# Patient Record
Sex: FEMALE | Race: WHITE | NOT HISPANIC OR LATINO | Employment: FULL TIME | URBAN - METROPOLITAN AREA
[De-identification: names, ages, dates, MRNs, and addresses within clinical notes are randomized per-mention and may not be internally consistent; named-entity substitution may affect disease eponyms.]

---

## 2021-03-20 ENCOUNTER — OFFICE VISIT (OUTPATIENT)
Dept: URGENT CARE | Facility: URGENT CARE | Age: 31
End: 2021-03-20
Payer: COMMERCIAL

## 2021-03-20 VITALS
SYSTOLIC BLOOD PRESSURE: 119 MMHG | DIASTOLIC BLOOD PRESSURE: 81 MMHG | OXYGEN SATURATION: 100 % | TEMPERATURE: 99.2 F | HEART RATE: 70 BPM | WEIGHT: 190.2 LBS

## 2021-03-20 DIAGNOSIS — B37.31 CANDIDIASIS OF VAGINA: ICD-10-CM

## 2021-03-20 DIAGNOSIS — H60.11 CELLULITIS OF EXTERNAL EAR, RIGHT: Primary | ICD-10-CM

## 2021-03-20 DIAGNOSIS — R50.9 FEVER, UNSPECIFIED FEVER CAUSE: ICD-10-CM

## 2021-03-20 LAB
DEPRECATED S PYO AG THROAT QL EIA: NEGATIVE
SPECIMEN SOURCE: NORMAL

## 2021-03-20 PROCEDURE — 99N1174 PR STATISTIC STREP A RAPID: Performed by: INTERNAL MEDICINE

## 2021-03-20 PROCEDURE — 87651 STREP A DNA AMP PROBE: CPT | Performed by: INTERNAL MEDICINE

## 2021-03-20 PROCEDURE — 99203 OFFICE O/P NEW LOW 30 MIN: CPT | Performed by: INTERNAL MEDICINE

## 2021-03-20 RX ORDER — NICOTINE POLACRILEX 4 MG/1
20 GUM, CHEWING ORAL DAILY
COMMUNITY

## 2021-03-20 RX ORDER — DOXYCYCLINE 100 MG/1
100 CAPSULE ORAL 2 TIMES DAILY
Qty: 20 CAPSULE | Refills: 0 | Status: SHIPPED | OUTPATIENT
Start: 2021-03-20 | End: 2021-03-30

## 2021-03-20 RX ORDER — BUSPIRONE HYDROCHLORIDE 7.5 MG/1
7.5 TABLET ORAL 3 TIMES DAILY
COMMUNITY

## 2021-03-20 RX ORDER — BUPROPION HYDROCHLORIDE 100 MG/1
100 TABLET, EXTENDED RELEASE ORAL 2 TIMES DAILY
COMMUNITY

## 2021-03-20 RX ORDER — FLUCONAZOLE 150 MG/1
150 TABLET ORAL ONCE
Qty: 1 TABLET | Refills: 0 | Status: SHIPPED | OUTPATIENT
Start: 2021-03-20 | End: 2021-03-20

## 2021-03-20 RX ORDER — HYDROXYZINE HYDROCHLORIDE 50 MG/1
50 TABLET, FILM COATED ORAL 3 TIMES DAILY PRN
COMMUNITY

## 2021-03-21 LAB
SPECIMEN SOURCE: NORMAL
STREP GROUP A PCR: NOT DETECTED

## 2021-03-21 NOTE — PROGRESS NOTES
Assessment & Plan     Cellulitis of external ear, right  - doxycycline hyclate (VIBRAMYCIN) 100 MG capsule; Take 1 capsule (100 mg) by mouth 2 times daily for 10 days    Fever, unspecified fever cause  - Streptococcus A Rapid Scr w Reflx to PCR  - Group A Streptococcus PCR Throat Swab    Candidiasis of vagina  - fluconazole (DIFLUCAN) 150 MG tablet; Take 1 tablet (150 mg) by mouth once for 1 dose      Hema Narayanan MD  [id number] [place]    Subjective     HPI   Son had strep 10 days ago.  She was negative at that time.  Now she is noting ear pain and sensitivity on the inner portion of the right ear.  Feels very sensitive in the ear canal and has more pain with jaw opening and pressure over the right side of the face.  No pain with swallowing.  Does sound muffled. Pain with head rotation.  Low grade fevers (up to 99.7) over the last three days.  No cough.  No nasal congestion.         Review of Systems   Constitutional, HEENT, cardiovascular, pulmonary, gi and gu systems are negative, except as otherwise noted.      Objective    /81   Pulse 70   Temp 99.2  F (37.3  C)   Wt 86.3 kg (190 lb 3.2 oz)   SpO2 100%   There is no height or weight on file to calculate BMI.  Physical Exam   GENERAL APPEARANCE: healthy, alert and no distress  EYES: Eyes grossly normal to inspection, PERRL and conjunctivae and sclerae normal  HENT: OP clear but there is significant right facial pain with jaw opening; there is warmth, mild erythema, swelling and tenderness of the external ear on the right which extends anteriorly over the cheek and along the line of the mandible as well as into the submandibular soft tissues; there is also some tenderness behind the external ear; the ear canal is erythematous and swollen; the skin of the external ear is flaking and eczematous; the right TM is clear; the left TM is clear  NECK: swelling and tenderness involving the soft tissues of the anterior cervical triangle  just below the angle of the jaw on the right side; no focal mass, nodule, fluctuance or induration  RESP: lungs clear to auscultation - no rales, rhonchi or wheezes    Results for orders placed or performed in visit on 03/20/21 (from the past 24 hour(s))   Streptococcus A Rapid Scr w Reflx to PCR    Specimen: Throat   Result Value Ref Range    Strep Specimen Description Throat     Streptococcus Group A Rapid Screen Negative NEG^Negative

## 2021-04-09 ENCOUNTER — OFFICE VISIT (OUTPATIENT)
Dept: URGENT CARE | Facility: URGENT CARE | Age: 31
End: 2021-04-09
Payer: COMMERCIAL

## 2021-04-09 VITALS
TEMPERATURE: 97.6 F | DIASTOLIC BLOOD PRESSURE: 70 MMHG | WEIGHT: 193 LBS | HEART RATE: 73 BPM | OXYGEN SATURATION: 100 % | SYSTOLIC BLOOD PRESSURE: 127 MMHG

## 2021-04-09 DIAGNOSIS — N64.4 BREAST PAIN, RIGHT: Primary | ICD-10-CM

## 2021-04-09 LAB — HCG UR QL: NEGATIVE

## 2021-04-09 PROCEDURE — 99203 OFFICE O/P NEW LOW 30 MIN: CPT | Performed by: PHYSICIAN ASSISTANT

## 2021-04-09 PROCEDURE — 81025 URINE PREGNANCY TEST: CPT | Performed by: PHYSICIAN ASSISTANT

## 2021-04-09 RX ORDER — IBUPROFEN 200 MG
200 TABLET ORAL EVERY 4 HOURS PRN
COMMUNITY

## 2021-04-09 NOTE — PROGRESS NOTES
Assessment & Plan     1. Breast pain, right  Patient with right breast pain for the past one month.  On exam, breast is benign.  No lumps are noted.  She does not have bruising, swelling or erythema noted.   Given her duration of symptoms, I advised her to recheck to her primary doctor to obtain mammography plus or minus ultrasound.  Patient is agreement with treatment plan.        Return in about 1 week (around 4/16/2021) for Follow up with PCP for breast imaging.    Diagnosis and treatment plan was reviewed with patient and/or family.   We went over any labs or imaging.   Patient verbalizes understanding. All questions were addressed and answered.     GUILLERMO Waters [facility] [place]    CHIEF COMPLAINT:   Chief Complaint   Patient presents with     Urgent Care     Breast Problem     R. side is uncomfortable x 1 mo.     Galileo Subramanian is a 31 year old female who presents to clinic today for evaluation of breast pain.  For the past 1 month patient has had discomfort of her right breast.  Reports that pain is localized to her nipple.  She has been using Vaseline and Aquaphor on the area without improvement in symptoms.  She has not noticed a rash, discharge, redness or swelling.  She uses Nexplanon for birth control, and does not get her period.  Denies having a fever, chills.  She does not have family history of breast cancer.      No past medical history on file.  No past surgical history on file.  Social History     Tobacco Use     Smoking status: Never Smoker     Smokeless tobacco: Never Used   Substance Use Topics     Alcohol use: Not on file     Current Outpatient Medications   Medication     albuterol (PROAIR RESPICLICK) 108 (90 Base) MCG/ACT inhaler     buPROPion (WELLBUTRIN SR) 100 MG 12 hr tablet     busPIRone (BUSPAR) 7.5 MG tablet     hydrOXYzine (ATARAX) 50 MG tablet     ibuprofen (ADVIL/MOTRIN) 200 MG tablet     omeprazole 20 MG tablet     No current  facility-administered medications for this visit.      No Known Allergies    10 point ROS of systems were all negative except for pertinent positives noted in my HPI.      Exam:  /70   Pulse 73   Temp 97.6  F (36.4  C)   Wt 87.5 kg (193 lb)   SpO2 100%   Constitutional: healthy, alert and no distress  Head: Normocephalic, atraumatic.  Eyes: conjunctiva clear, no drainage  Breast: R breast has TTP around nipple. No swelling, erythema or nipple drainage is noted. Breast lump is not palpated.   Skin: no rashes  Neurologic: Speech clear, gait normal. Moves all extremities.    Results for orders placed or performed in visit on 04/09/21   HCG Qual, Urine ([id number])     Status: None   Result Value Ref Range    HCG Qual Urine Negative NEG^Negative

## 2022-08-17 ENCOUNTER — OFFICE VISIT (OUTPATIENT)
Dept: URGENT CARE | Facility: URGENT CARE | Age: 32
End: 2022-08-17
Payer: COMMERCIAL

## 2022-08-17 VITALS
DIASTOLIC BLOOD PRESSURE: 80 MMHG | HEART RATE: 84 BPM | TEMPERATURE: 99.1 F | OXYGEN SATURATION: 100 % | SYSTOLIC BLOOD PRESSURE: 115 MMHG

## 2022-08-17 DIAGNOSIS — R05.9 COUGH: Primary | ICD-10-CM

## 2022-08-17 DIAGNOSIS — J45.21 MILD INTERMITTENT ASTHMA WITH ACUTE EXACERBATION: ICD-10-CM

## 2022-08-17 LAB
DEPRECATED S PYO AG THROAT QL EIA: NEGATIVE
GROUP A STREP BY PCR: NOT DETECTED

## 2022-08-17 PROCEDURE — U0005 INFEC AGEN DETEC AMPLI PROBE: HCPCS | Performed by: FAMILY MEDICINE

## 2022-08-17 PROCEDURE — 87651 STREP A DNA AMP PROBE: CPT | Performed by: FAMILY MEDICINE

## 2022-08-17 PROCEDURE — U0003 INFECTIOUS AGENT DETECTION BY NUCLEIC ACID (DNA OR RNA); SEVERE ACUTE RESPIRATORY SYNDROME CORONAVIRUS 2 (SARS-COV-2) (CORONAVIRUS DISEASE [COVID-19]), AMPLIFIED PROBE TECHNIQUE, MAKING USE OF HIGH THROUGHPUT TECHNOLOGIES AS DESCRIBED BY CMS-2020-01-R: HCPCS | Performed by: FAMILY MEDICINE

## 2022-08-17 PROCEDURE — 99214 OFFICE O/P EST MOD 30 MIN: CPT | Mod: CS | Performed by: FAMILY MEDICINE

## 2022-08-17 RX ORDER — PREDNISONE 20 MG/1
40 TABLET ORAL DAILY
Qty: 10 TABLET | Refills: 0 | Status: SHIPPED | OUTPATIENT
Start: 2022-08-17 | End: 2022-08-22

## 2022-08-17 RX ORDER — BENZONATATE 200 MG/1
200 CAPSULE ORAL 3 TIMES DAILY PRN
Qty: 30 CAPSULE | Refills: 0 | Status: SHIPPED | OUTPATIENT
Start: 2022-08-17

## 2022-08-17 RX ORDER — ALBUTEROL SULFATE 90 UG/1
2 AEROSOL, METERED RESPIRATORY (INHALATION) EVERY 6 HOURS PRN
Qty: 18 G | Refills: 0 | Status: SHIPPED | OUTPATIENT
Start: 2022-08-17

## 2022-08-17 NOTE — PROGRESS NOTES
SUBJECTIVE:   Marilynn Light is a 32 year old female presenting with a chief complaint of cough, sore throat, fever.  Onset of symptoms was 4 day(s) ago.  Course of illness is worsening.    Severity moderate  Current and Associated symptoms: cough, SOB/wheezing, fever  Treatment measures tried include OTC Cough med - mucinex, Fluids, Rest and ibuprofen.  Predisposing factors include ill contact: Family member - 3 year old pee .    Has asthma but mild, no longer has inhaler    Has sore throat initially but this did improve but then feeling more in lungs, endorse wheezing and SOB    No TOB use    Completed COVID vaccination, boosted X1    No past medical history on file.  Current Outpatient Medications   Medication Sig Dispense Refill     albuterol (PROAIR RESPICLICK) 108 (90 Base) MCG/ACT inhaler Inhale 2 puffs into the lungs every 6 hours as needed for shortness of breath / dyspnea or wheezing       buPROPion (WELLBUTRIN SR) 100 MG 12 hr tablet Take 100 mg by mouth 2 times daily       busPIRone (BUSPAR) 7.5 MG tablet Take 7.5 mg by mouth 3 times daily       hydrOXYzine (ATARAX) 50 MG tablet Take 50 mg by mouth 3 times daily as needed for itching       ibuprofen (ADVIL/MOTRIN) 200 MG tablet Take 200 mg by mouth every 4 hours as needed for mild pain       omeprazole 20 MG tablet Take 20 mg by mouth daily       Social History     Tobacco Use     Smoking status: Never Smoker     Smokeless tobacco: Never Used   Substance Use Topics     Alcohol use: Not on file       ROS:  Review of systems negative except as stated above.    OBJECTIVE:  /80   Pulse 84   Temp 99.1  F (37.3  C)   SpO2 100%   GENERAL APPEARANCE: healthy, alert and no distress  EYES: EOMI,  PERRL, conjunctiva clear  HENT: Mouth without ulcers, erythema or lesions  RESP: lungs with a few rhonchi and scattered wheezes, no crackles  PSYCH: mentation appears normal and affect normal/bright    Results for orders placed or performed in visit on 08/17/22    Streptococcus A Rapid Screen w/Reflex to PCR - Clinic Collect     Status: Normal    Specimen: Throat; Swab   Result Value Ref Range    Group A Strep antigen Negative Negative       ASSESSMENT/PLAN:  (R05.9) Cough  (primary encounter diagnosis)  Plan: Symptomatic; Unknown COVID-19 Virus         (Coronavirus) by PCR Nose, Streptococcus A         Rapid Screen w/Reflex to PCR - Clinic Collect,         Group A Streptococcus PCR Throat Swab,         benzonatate (TESSALON) 200 MG capsule,         albuterol (PROAIR HFA/PROVENTIL HFA/VENTOLIN         HFA) 108 (90 Base) MCG/ACT inhaler            (J45.21) Mild intermittent asthma with acute exacerbation  Plan: albuterol (PROAIR HFA/PROVENTIL HFA/VENTOLIN         HFA) 108 (90 Base) MCG/ACT inhaler, predniSONE         (DELTASONE) 20 MG tablet            Reassurance given, discussed that most likely viral etiology, reviewed symptomatic treatment with tylenol, ibuprofen, plenty of fluids and rest.  Will follow up on throat culture and treat if positive for strep.  COVID screen obtained as symptoms overlap with COVID infection, quarantine while awaiting result.    RX tessalon perles given to help with cough.  RX albuterol inhaler given for asthma symptoms.  RX prednisone burst given for asthma flare if symptoms persists or worsens.    Follow up with primary provider if no improvement of symptoms in 1 week    Giancarlo Simon MD  August 17, 2022 2:46 PM

## 2022-08-17 NOTE — PATIENT INSTRUCTIONS
Okay to take ibuprofen 200 mg - 4 tablets (800 mg) every 8 hours as needed.  Okay to take tylenol 500 mg - 2 tablets (1000 mg) every 6-8 hours as needed, do not exceed 3000 mg in 24 hours.  Okay to take tessalon perles to help with cough  Use albuterol inhaler to help with cough, shortness of breathing or wheezing  If asthma symptoms worsens, take prednisone burst    We will contact you if the throat culture is positive for strep

## 2022-08-18 LAB — SARS-COV-2 RNA RESP QL NAA+PROBE: NEGATIVE

## 2022-09-08 DIAGNOSIS — R05.9 COUGH: ICD-10-CM

## 2022-09-08 DIAGNOSIS — J45.21 MILD INTERMITTENT ASTHMA WITH ACUTE EXACERBATION: ICD-10-CM

## 2022-09-09 RX ORDER — ALBUTEROL SULFATE 90 UG/1
AEROSOL, METERED RESPIRATORY (INHALATION)
OUTPATIENT
Start: 2022-09-09

## 2022-09-25 ENCOUNTER — HEALTH MAINTENANCE LETTER (OUTPATIENT)
Age: 32
End: 2022-09-25

## 2023-10-14 ENCOUNTER — HEALTH MAINTENANCE LETTER (OUTPATIENT)
Age: 33
End: 2023-10-14

## 2024-12-01 ENCOUNTER — HEALTH MAINTENANCE LETTER (OUTPATIENT)
Age: 34
End: 2024-12-01